# Patient Record
(demographics unavailable — no encounter records)

---

## 2024-11-25 NOTE — CONSULT LETTER
[Dear  ___] : Dear  [unfilled], [Consult Letter:] : I had the pleasure of evaluating your patient, [unfilled]. [Please see my note below.] : Please see my note below. [Consult Closing:] : Thank you very much for allowing me to participate in the care of this patient.  If you have any questions, please do not hesitate to contact me. [Sincerely,] : Sincerely, [FreeTextEntry3] : Sun Arriaga, SY-BC Board Certified Family Nurse Practitioner Pediatric Neurology [facility] [address] [place] [address] Tel: [phone] Fax: [phone]

## 2024-11-25 NOTE — ASSESSMENT
[FreeTextEntry1] : JEANETTE is a 4 year old here with mother with concerns for ADHD. Currently in a general education classroom with no services in place. Non focal neuro exam. Denies staring, twitching, seizure or seizure-like activity. Discussed that due to his age he is still to young to formally evaluate and make a diagnosis of ADHD. Recommended to continue with psychoeducational testing for placement. Also recommended to reevaluate in 1 year if symptoms persist in .